# Patient Record
Sex: MALE | URBAN - METROPOLITAN AREA
[De-identification: names, ages, dates, MRNs, and addresses within clinical notes are randomized per-mention and may not be internally consistent; named-entity substitution may affect disease eponyms.]

---

## 2019-02-15 ENCOUNTER — OFFICE VISIT (OUTPATIENT)
Dept: URBAN - METROPOLITAN AREA CLINIC 18 | Facility: CLINIC | Age: 79
End: 2019-02-15
Payer: MEDICARE

## 2019-02-15 PROCEDURE — 99212 OFFICE O/P EST SF 10 MIN: CPT | Performed by: OPHTHALMOLOGY

## 2019-02-15 ASSESSMENT — INTRAOCULAR PRESSURE
OD: 13
OS: 12

## 2019-02-15 ASSESSMENT — VISUAL ACUITY
OD: 20/30
OS: 20/25

## 2019-02-15 ASSESSMENT — KERATOMETRY
OD: 42.75
OS: 42.00

## 2019-02-15 NOTE — IMPRESSION/PLAN
Impression: Vitreous degeneration, right eye: H43.811. Condition: established, stable. Plan: Posterior vitreous detachment accounts for the patient's complaints. There is no evidence of retinal pathology. All symptoms and risks of retinal detachment and tears were discussed in detail. Patient instructed to call the office immediately if any symptoms noted.

## 2019-02-15 NOTE — IMPRESSION/PLAN
Impression: Combined forms of age-related cataract, bilateral: H25.813. Condition: established, stable. Plan: Discussed diagnosis in detail with patient. No surgical treatment currently recommended. The patient will monitor vision changes and contact us with any decrease in vision. Recommend frequent use of artificial tears especially while reading. Recommend wearing yellow-orange tint glasses at night while driving to reduce glare.

## 2021-04-01 ENCOUNTER — OFFICE VISIT (OUTPATIENT)
Dept: URBAN - METROPOLITAN AREA CLINIC 17 | Facility: CLINIC | Age: 81
End: 2021-04-01
Payer: MEDICARE

## 2021-04-01 DIAGNOSIS — H43.811 VITREOUS DEGENERATION, RIGHT EYE: ICD-10-CM

## 2021-04-01 DIAGNOSIS — H25.812 COMBINED FORMS OF AGE-RELATED CATARACT, LEFT EYE: ICD-10-CM

## 2021-04-01 PROCEDURE — 99214 OFFICE O/P EST MOD 30 MIN: CPT | Performed by: OPHTHALMOLOGY

## 2021-04-01 ASSESSMENT — VISUAL ACUITY
OS: 20/25
OD: 20/50

## 2021-04-01 ASSESSMENT — INTRAOCULAR PRESSURE
OS: 12
OD: 12

## 2021-04-01 ASSESSMENT — KERATOMETRY
OD: 42.88
OS: 42.25

## 2021-04-01 NOTE — IMPRESSION/PLAN
Impression: Vitreous degeneration, right eye: H43.811. Condition: established, stable. Plan: There is no evidence of retinal pathology. Will continue to observe condition and or symptoms.

## 2021-04-01 NOTE — IMPRESSION/PLAN
Impression: Combined forms of age-related cataract, bilateral: H25.813. Vision: vision affected. Symptoms: could improve with surgery. Plan: Cataract accounts for patient's complaints. Reviewed risks, benefits, and procedure. Patient desires surgery, schedule ce/iol OD then OS, RL2, discussed lens options, distance refractive target, patient is clear for surgery in [facility].

## 2021-04-09 ENCOUNTER — PRE-OPERATIVE VISIT (OUTPATIENT)
Dept: URBAN - METROPOLITAN AREA CLINIC 17 | Facility: CLINIC | Age: 81
End: 2021-04-09
Payer: MEDICARE

## 2021-04-09 DIAGNOSIS — H25.813 COMBINED FORMS OF AGE-RELATED CATARACT, BILATERAL: Primary | ICD-10-CM

## 2021-04-09 ASSESSMENT — PACHYMETRY
OD: 24.87
OD: 3.74
OS: 3.80
OS: 24.98

## 2021-04-19 ENCOUNTER — SURGERY (OUTPATIENT)
Dept: URBAN - METROPOLITAN AREA SURGERY 7 | Facility: SURGERY | Age: 81
End: 2021-04-19
Payer: MEDICARE

## 2021-04-19 PROCEDURE — 66984 XCAPSL CTRC RMVL W/O ECP: CPT | Performed by: OPHTHALMOLOGY

## 2021-04-20 ENCOUNTER — POST-OPERATIVE VISIT (OUTPATIENT)
Dept: URBAN - METROPOLITAN AREA CLINIC 17 | Facility: CLINIC | Age: 81
End: 2021-04-20
Payer: MEDICARE

## 2021-04-20 PROCEDURE — 99024 POSTOP FOLLOW-UP VISIT: CPT | Performed by: OPTOMETRIST

## 2021-04-20 ASSESSMENT — INTRAOCULAR PRESSURE
OD: 21
OS: 13

## 2021-04-20 NOTE — IMPRESSION/PLAN
Impression: S/P Cataract Extraction by phacoemulsification with IOL placement [id number] OD - 1 Day. Encounter for surgical aftercare following surgery on a sense organ  Z48.810.  Post operative instructions reviewed - Plan: RTC in 1 week for PO2

## 2021-04-22 ENCOUNTER — POST-OPERATIVE VISIT (OUTPATIENT)
Dept: URBAN - METROPOLITAN AREA CLINIC 17 | Facility: CLINIC | Age: 81
End: 2021-04-22
Payer: MEDICARE

## 2021-04-22 PROCEDURE — 99024 POSTOP FOLLOW-UP VISIT: CPT | Performed by: OPTOMETRIST

## 2021-04-22 RX ORDER — PREDNISOLONE ACETATE 10 MG/ML
1 % SUSPENSION/ DROPS OPHTHALMIC
Qty: 5 | Refills: 0 | Status: INACTIVE
Start: 2021-04-22 | End: 2021-05-01

## 2021-04-22 ASSESSMENT — INTRAOCULAR PRESSURE: OD: 24

## 2021-04-22 NOTE — IMPRESSION/PLAN
Impression: S/P CE/Standard IOL SA60WF+ 18.00 OD - 3 Days. Encounter for surgical aftercare following surgery on a sense organ  Z48.810. Plan: 1 wk, PO2 --Continue Prednisolone acetate 1% qid OU x 1 wk; attempted lid eversion, patient uncooperative.

## 2021-04-26 ENCOUNTER — POST-OPERATIVE VISIT (OUTPATIENT)
Dept: URBAN - METROPOLITAN AREA CLINIC 17 | Facility: CLINIC | Age: 81
End: 2021-04-26
Payer: MEDICARE

## 2021-04-26 DIAGNOSIS — Z48.810 ENCOUNTER FOR SURGICAL AFTERCARE FOLLOWING SURGERY ON A SENSE ORGAN: Primary | ICD-10-CM

## 2021-04-26 PROCEDURE — 99024 POSTOP FOLLOW-UP VISIT: CPT | Performed by: OPHTHALMOLOGY

## 2021-04-26 ASSESSMENT — VISUAL ACUITY: OD: 20/25+2

## 2021-04-26 ASSESSMENT — INTRAOCULAR PRESSURE
OD: 11
OS: 13

## 2021-04-26 NOTE — IMPRESSION/PLAN
Impression: S/P CE/Standard IOL SA60WF+ 18.00 OD - 7 Days. Encounter for surgical aftercare following surgery on a sense organ  Z48.810. Post operative instructions reviewed - Plan: no restrictions starting today. --Advised patient to use artificial tears for comfort. Decrease Pred ace to BID and finish 10 days.

## 2021-05-03 ENCOUNTER — SURGERY (OUTPATIENT)
Dept: URBAN - METROPOLITAN AREA SURGERY 7 | Facility: SURGERY | Age: 81
End: 2021-05-03
Payer: MEDICARE

## 2021-05-03 PROCEDURE — 66984 XCAPSL CTRC RMVL W/O ECP: CPT | Performed by: OPHTHALMOLOGY

## 2021-05-04 ENCOUNTER — POST-OPERATIVE VISIT (OUTPATIENT)
Dept: URBAN - METROPOLITAN AREA CLINIC 17 | Facility: CLINIC | Age: 81
End: 2021-05-04
Payer: MEDICARE

## 2021-05-04 PROCEDURE — 99024 POSTOP FOLLOW-UP VISIT: CPT | Performed by: OPTOMETRIST

## 2021-05-04 ASSESSMENT — INTRAOCULAR PRESSURE
OD: 14
OS: 16

## 2021-05-04 NOTE — IMPRESSION/PLAN
Impression: S/P Cataract Extraction by phacoemulsification with IOL placement [id number] OS - 1 Day. Presence of intraocular lens  Z96.1. Post operative instructions reviewed - Plan: RTC in 1 week for PO2 
--Discontinue Prednisolone acetate 1% --Advised patient to use artificial tears for comfort.

## 2021-05-10 ENCOUNTER — POST-OPERATIVE VISIT (OUTPATIENT)
Dept: URBAN - METROPOLITAN AREA CLINIC 17 | Facility: CLINIC | Age: 81
End: 2021-05-10
Payer: MEDICARE

## 2021-05-10 PROCEDURE — 99024 POSTOP FOLLOW-UP VISIT: CPT | Performed by: OPTOMETRIST

## 2021-05-10 ASSESSMENT — VISUAL ACUITY
OD: 20/20
OS: 20/20

## 2021-05-10 ASSESSMENT — INTRAOCULAR PRESSURE
OS: 11
OD: 13

## 2021-05-10 NOTE — IMPRESSION/PLAN
Impression: S/P CE/Standard IOL SA60WF+ 18.00 OS - 7 Days. Presence of intraocular lens  Z96.1.  Condition is improving - Plan: RTC in 3 weeks for PO3
(Patient will go to [facility] for refraction)

## 2021-05-13 ENCOUNTER — OFFICE VISIT (OUTPATIENT)
Dept: URBAN - METROPOLITAN AREA CLINIC 17 | Facility: CLINIC | Age: 81
End: 2021-05-13
Payer: MEDICARE

## 2021-05-13 DIAGNOSIS — H02.132 SENILE ECTROPION OF RIGHT LOWER EYELID: Primary | Chronic | ICD-10-CM

## 2021-05-13 PROCEDURE — 99213 OFFICE O/P EST LOW 20 MIN: CPT | Performed by: OPHTHALMOLOGY

## 2021-05-13 ASSESSMENT — INTRAOCULAR PRESSURE
OD: 14
OS: 16

## 2021-05-13 NOTE — IMPRESSION/PLAN
Impression: Senile ectropion of right lower eyelid: H02.132. Right. Condition: established, stable. Symptoms: will continue to monitor. Vision: vision not affected. Plan: Discussed diagnosis in detail with patient. Discussed treatment options with patient. No treatment is required at this time, right lower eyelid is clinically stable. Residual ectropion is mild and stable from last repair in 2017, no further intervention is warranted at this time.

## 2021-06-22 ENCOUNTER — POST-OPERATIVE VISIT (OUTPATIENT)
Dept: URBAN - METROPOLITAN AREA CLINIC 17 | Facility: CLINIC | Age: 81
End: 2021-06-22
Payer: MEDICARE

## 2021-06-22 DIAGNOSIS — Z96.1 PRESENCE OF INTRAOCULAR LENS: Primary | ICD-10-CM

## 2021-06-22 PROCEDURE — 99024 POSTOP FOLLOW-UP VISIT: CPT | Performed by: OPTOMETRIST

## 2021-06-22 ASSESSMENT — INTRAOCULAR PRESSURE
OD: 16
OS: 14

## 2021-06-22 NOTE — IMPRESSION/PLAN
Impression: S/P CE/Standard IOL SA60WF+ 18.00 OS - 50 Days. Presence of intraocular lens  Z96.1. Plan: Return OD for refraction --Advised patient to use artificial tears for comfort. 
***DEXYCU